# Patient Record
Sex: FEMALE | Race: BLACK OR AFRICAN AMERICAN
[De-identification: names, ages, dates, MRNs, and addresses within clinical notes are randomized per-mention and may not be internally consistent; named-entity substitution may affect disease eponyms.]

---

## 2018-08-15 ENCOUNTER — NEW PATIENT (OUTPATIENT)
Age: 43
End: 2018-08-15

## 2018-08-15 DIAGNOSIS — S05.10XA: ICD-10-CM

## 2018-08-15 PROCEDURE — 99203 OFFICE O/P NEW LOW 30 MIN: CPT

## 2018-08-15 ASSESSMENT — VISUAL ACUITY
OD_CC: 20/25-1
OS_CC: 20/20

## 2018-08-15 ASSESSMENT — TONOMETRY
OS_IOP_MMHG: 16
OD_IOP_MMHG: 22

## 2020-10-29 ENCOUNTER — DILATED FUNDUS EXAM (OUTPATIENT)
Age: 45
End: 2020-10-29

## 2020-10-29 DIAGNOSIS — E11.9: ICD-10-CM

## 2020-10-29 DIAGNOSIS — H43.393: ICD-10-CM

## 2020-10-29 DIAGNOSIS — H52.13: ICD-10-CM

## 2020-10-29 DIAGNOSIS — H52.4: ICD-10-CM

## 2020-10-29 PROCEDURE — 92014 COMPRE OPH EXAM EST PT 1/>: CPT

## 2020-10-29 PROCEDURE — 92015 DETERMINE REFRACTIVE STATE: CPT

## 2020-10-29 PROCEDURE — 92310 CONTACT LENS FITTING OU: CPT

## 2020-10-29 ASSESSMENT — KERATOMETRY
OD_K1POWER_DIOPTERS: 44.75
OD_K2POWER_DIOPTERS: 45.5
OS_AXISANGLE_DEGREES: 4
OD_AXISANGLE_DEGREES: 4
OD_AXISANGLE2_DEGREES: 94
OS_AXISANGLE2_DEGREES: 94
OS_K1POWER_DIOPTERS: 44.25
OS_K2POWER_DIOPTERS: 45.25

## 2020-10-29 ASSESSMENT — TONOMETRY
OS_IOP_MMHG: 20
OD_IOP_MMHG: 19

## 2020-10-29 ASSESSMENT — VISUAL ACUITY
OD_CC: 20/25
OS_CC: 20/20

## 2020-11-13 ENCOUNTER — RX CHANGE - NO APPT (OUTPATIENT)
Age: 45
End: 2020-11-13

## 2020-11-13 ASSESSMENT — KERATOMETRY
OD_AXISANGLE_DEGREES: 4
OS_K2POWER_DIOPTERS: 45.25
OS_AXISANGLE_DEGREES: 4
OD_K2POWER_DIOPTERS: 45.5
OS_K1POWER_DIOPTERS: 44.25
OS_AXISANGLE2_DEGREES: 94
OD_K1POWER_DIOPTERS: 44.75
OD_AXISANGLE2_DEGREES: 94

## 2021-10-29 ENCOUNTER — DILATED DIABETIC EYE EXAM (OUTPATIENT)
Age: 46
End: 2021-10-29

## 2021-10-29 DIAGNOSIS — H52.13: ICD-10-CM

## 2021-10-29 DIAGNOSIS — E11.9: ICD-10-CM

## 2021-10-29 DIAGNOSIS — H43.393: ICD-10-CM

## 2021-10-29 PROCEDURE — 92015 DETERMINE REFRACTIVE STATE: CPT

## 2021-10-29 PROCEDURE — 99213 OFFICE O/P EST LOW 20 MIN: CPT

## 2021-10-29 ASSESSMENT — KERATOMETRY
OS_AXISANGLE2_DEGREES: 94
OD_K1POWER_DIOPTERS: 44.75
OD_AXISANGLE2_DEGREES: 94
OD_AXISANGLE_DEGREES: 4
OD_K2POWER_DIOPTERS: 45.5
OS_K2POWER_DIOPTERS: 45.25
OS_AXISANGLE_DEGREES: 4
OS_K1POWER_DIOPTERS: 44.25

## 2021-10-29 ASSESSMENT — TONOMETRY
OD_IOP_MMHG: 12
OS_IOP_MMHG: 13

## 2021-10-29 ASSESSMENT — VISUAL ACUITY
OS_CC: 20/20-2
OD_CC: 20/20

## 2025-06-26 ENCOUNTER — APPOINTMENT (OUTPATIENT)
Dept: URBAN - METROPOLITAN AREA CLINIC 184 | Facility: CLINIC | Age: 50
Setting detail: DERMATOLOGY
End: 2025-06-26

## 2025-06-26 DIAGNOSIS — L68.0 HIRSUTISM: ICD-10-CM

## 2025-06-26 DIAGNOSIS — L21.8 OTHER SEBORRHEIC DERMATITIS: ICD-10-CM

## 2025-06-26 PROCEDURE — ? PRESCRIPTION

## 2025-06-26 PROCEDURE — ? COUNSELING

## 2025-06-26 PROCEDURE — ? PRESCRIPTION MEDICATION MANAGEMENT

## 2025-06-26 RX ORDER — KETOCONAZOLE 2 G/100G
AEROSOL, FOAM TOPICAL
Qty: 50 | Refills: 3 | Status: ERX | COMMUNITY
Start: 2025-06-26

## 2025-06-26 RX ORDER — SPIRONOLACTONE 50 MG/1
TABLET, FILM COATED ORAL
Qty: 180 | Refills: 3 | Status: ERX | COMMUNITY
Start: 2025-06-26

## 2025-06-26 RX ADMIN — SPIRONOLACTONE: 50 TABLET, FILM COATED ORAL at 00:00

## 2025-06-26 RX ADMIN — KETOCONAZOLE: 2 AEROSOL, FOAM TOPICAL at 00:00

## 2025-06-26 NOTE — PROCEDURE: PRESCRIPTION MEDICATION MANAGEMENT
Render In Strict Bullet Format?: No
Detail Level: Zone
Initiate Treatment: spironolactone 50 mg tablet
Initiate Treatment: ketoconazole 2 % topical foam

## 2025-06-26 NOTE — PROCEDURE: MIPS QUALITY
Quality 130: Documentation Of Current Medications In The Medical Record: Current Medications Documented
Detail Level: Detailed
Quality 134: Screening For Clinical Depression And Follow-Up Plan: Depression Screening not documented, reason not given
Quality 431: Preventive Care And Screening: Unhealthy Alcohol Use - Screening: Patient not identified as an unhealthy alcohol user when screened for unhealthy alcohol use using a systematic screening method
Quality 47: Advance Care Plan: Advance care planning not documented, reason not otherwise specified.
Quality 226: Preventive Care And Screening: Tobacco Use: Screening And Cessation Intervention: Patient screened for tobacco use and is an ex/non-smoker